# Patient Record
Sex: FEMALE | Race: BLACK OR AFRICAN AMERICAN
[De-identification: names, ages, dates, MRNs, and addresses within clinical notes are randomized per-mention and may not be internally consistent; named-entity substitution may affect disease eponyms.]

---

## 2017-02-11 ENCOUNTER — HOSPITAL ENCOUNTER (OUTPATIENT)
Age: 27
End: 2017-02-11
Payer: MEDICAID

## 2017-02-11 ENCOUNTER — HOSPITAL ENCOUNTER (EMERGENCY)
Age: 27
Discharge: HOME | End: 2017-02-11
Payer: MEDICAID

## 2017-02-11 DIAGNOSIS — S00.83XA: ICD-10-CM

## 2017-02-11 DIAGNOSIS — R03.0: ICD-10-CM

## 2017-02-11 DIAGNOSIS — H11.32: ICD-10-CM

## 2017-02-11 DIAGNOSIS — S02.2XXA: Primary | ICD-10-CM
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
## 2017-02-11 PROCEDURE — 70480 CT ORBIT/EAR/FOSSA W/O DYE: CPT

## 2017-02-11 PROCEDURE — 99283 EMERGENCY DEPT VISIT LOW MDM: CPT

## 2017-02-14 ENCOUNTER — HOSPITAL ENCOUNTER (OUTPATIENT)
Age: 27
Discharge: HOME | End: 2017-02-14
Payer: MEDICAID

## 2017-02-14 DIAGNOSIS — Z11.3: Primary | ICD-10-CM

## 2017-04-28 ENCOUNTER — HOSPITAL ENCOUNTER (OUTPATIENT)
Age: 27
Discharge: HOME | End: 2017-04-28
Payer: MEDICAID

## 2017-04-28 DIAGNOSIS — Z11.3: Primary | ICD-10-CM

## 2017-06-14 ENCOUNTER — HOSPITAL ENCOUNTER (EMERGENCY)
Dept: HOSPITAL 76 - ED | Age: 27
Discharge: HOME | End: 2017-06-14
Payer: MEDICAID

## 2017-06-14 VITALS — SYSTOLIC BLOOD PRESSURE: 114 MMHG | DIASTOLIC BLOOD PRESSURE: 72 MMHG

## 2017-06-14 DIAGNOSIS — M54.5: Primary | ICD-10-CM

## 2017-06-14 PROCEDURE — 99283 EMERGENCY DEPT VISIT LOW MDM: CPT

## 2017-06-14 NOTE — ED PHYSICIAN DOCUMENTATION
PD HPI BACK PAIN





- Stated complaint


Stated Complaint: LOW BACK PX





- Chief complaint


Chief Complaint: Back Pain





- History obtained from


History obtained from: Patient





- History of Present Illness


Timing - onset: Other (Low back pain, especially on the left for about a month 

after moving furniture at home and worse after a car trip.  She saw her primary 

care physician today and had a trigger point injection which helped briefly but 

then she was worse after that.  Pain is in the left low back and doesn't 

radiate.  No weakness, numbness, tingling of the extremities or saddle area.  

No incontinence.  No fevers.)





Review of Systems


Constitutional: denies: Fever, Chills


GI: denies: Abdominal Pain, Nausea, Vomiting


: denies: Now pregnant EGA


Musculoskeletal: denies: Neck pain





PD PAST MEDICAL HISTORY





- Past Medical History


Past Medical History: No


Cardiovascular: None


Respiratory: None


Neuro: None


Endocrine/Autoimmune: None


GI: None


GYN: None


: None


HEENT: None


Psych: None


Musculoskeletal: None


Derm: None





- Past Surgical History


Past Surgical History: No


General: Hiatal hernia repair





- Present Medications


Home Medications: 


 Ambulatory Orders











 Medication  Instructions  Recorded  Confirmed


 


Methocarbamol 750 mg PO 06/14/17 


 


Naproxen 500 mg PO BID #14 tablet 06/14/17 


 


traMADol [Ultram] 50 mg PO Q4-6H PRN #15 tablet 06/14/17 














- Allergies


Allergies/Adverse Reactions: 


 Allergies











Allergy/AdvReac Type Severity Reaction Status Date / Time


 


acetaminophen [From Vicodin] Allergy  Itching Verified 10/03/16 20:22


 


hydrocodone bitartrate * Allergy  Itching Verified 10/03/16 20:22





[From Vicodin]     














- Social History


Does the pt smoke?: No


Smoking Status: Never smoker


Does the pt drink ETOH?: Yes


Does the pt have substance abuse?: No





- Immunizations


Immunizations are current?: Yes





- POLST


Patient has POLST: No





PD ED PE NORMAL





- Vitals


Vital signs reviewed: Yes





- General


General: Alert and oriented X 3, No acute distress





- Abdomen


Abdomen: Normal bowel sounds, Soft, Non tender





- Back


Back: No CVA TTP, No spinal TTP, Other (The patient has equal and normal 

patellar and Achilles reflexes bilaterally. Normal sensation in all areas of 

the legs. Patient denies saddle anesthesia. Normal strength in flexion and 

extension at the ankles, knees and flexion of the hips.)





- Neuro


Neuro: Alert and oriented X 3, Normal speech





- Psych


Psych: Normal mood, Normal affect





Results





- Vitals


Vitals: 


 Vital Signs - 24 hr











  06/14/17





  21:04


 


Temperature 36.3 C L


 


Heart Rate 62


 


Respiratory 16





Rate 


 


Blood Pressure 114/72


 


O2 Saturation 100








 Oxygen











O2 Source                      Room air

















PD MEDICAL DECISION MAKING





- ED course


ED course: 





Spinal epidural abscess was considered in this patient. The patient has no fever

, is not diabetic, has no spinal tenderness, does not use IV drugs, and no 

bilateral neurologic symptoms. Therefore, spinal epidural abscess is considered 

exceedingly unlikely.





The online [place] prescription monitoring program was queried with regard 

to this patient. No concerning findings were found. 





Departure





- Departure


Disposition: 01 Home, Self Care


Clinical Impression: 


Lumbago


Qualifiers:


 Chronicity: acute Back pain laterality: left Sciatica presence: without 

sciatica Qualified Code(s): M54.5 - Low back pain


Condition: Good


Record reviewed to determine appropriate education?: Yes


Instructions:  ED Sprain Strain Lumbar


Prescriptions: 


Naproxen 500 mg PO BID #14 tablet


traMADol [Ultram] 50 mg PO Q4-6H PRN #15 tablet


 PRN Reason: Pain


Comments: 


Call your doctor to arrange a follow up appointment. Make the next available 

appointment. In the interim return anytime if worse or if new symptoms develop.

## 2017-08-09 ENCOUNTER — HOSPITAL ENCOUNTER (OUTPATIENT)
Dept: HOSPITAL 76 - EMS | Age: 27
End: 2017-08-09
Attending: SURGERY
Payer: MEDICAID

## 2017-08-09 DIAGNOSIS — Z03.89: Primary | ICD-10-CM

## 2017-08-30 ENCOUNTER — HOSPITAL ENCOUNTER (OUTPATIENT)
Dept: HOSPITAL 76 - LAB.R | Age: 27
Discharge: HOME | End: 2017-08-30
Attending: ADVANCED PRACTICE MIDWIFE
Payer: MEDICAID

## 2017-08-30 DIAGNOSIS — Z11.3: Primary | ICD-10-CM

## 2017-08-30 PROCEDURE — 87591 N.GONORRHOEAE DNA AMP PROB: CPT

## 2017-08-30 PROCEDURE — 87491 CHLMYD TRACH DNA AMP PROBE: CPT

## 2017-09-13 ENCOUNTER — HOSPITAL ENCOUNTER (OUTPATIENT)
Dept: HOSPITAL 76 - LAB | Age: 27
Discharge: HOME | End: 2017-09-13
Attending: ADVANCED PRACTICE MIDWIFE
Payer: MEDICAID

## 2017-09-13 DIAGNOSIS — O09.71: Primary | ICD-10-CM

## 2017-09-13 DIAGNOSIS — Z11.3: ICD-10-CM

## 2017-09-13 LAB
ALBUMIN/GLOB SERPL: 1.4 {RATIO} (ref 1–2.2)
ANION GAP SERPL CALCULATED.4IONS-SCNC: 8 MMOL/L (ref 6–13)
BASOPHILS NFR BLD AUTO: 0 10^3/UL (ref 0–0.1)
BASOPHILS NFR BLD AUTO: 0.6 %
BILIRUB BLD-MCNC: 0.6 MG/DL (ref 0.2–1)
BUN SERPL-MCNC: 10 MG/DL (ref 6–20)
CALCIUM UR-MCNC: 9.1 MG/DL (ref 8.5–10.3)
CHLORIDE SERPL-SCNC: 105 MMOL/L (ref 101–111)
CO2 SERPL-SCNC: 21 MMOL/L (ref 21–32)
CREAT SERPLBLD-SCNC: 0.7 MG/DL (ref 0.4–1)
EOSINOPHIL # BLD AUTO: 0.1 10^3/UL (ref 0–0.7)
EOSINOPHIL NFR BLD AUTO: 1.2 %
ERYTHROCYTE [DISTWIDTH] IN BLOOD BY AUTOMATED COUNT: 14.1 % (ref 12–15)
GFRSERPLBLD MDRD-ARVRAT: 121 ML/MIN/{1.73_M2} (ref 89–?)
GLOBULIN SER-MCNC: 3.2 G/DL (ref 2.1–4.2)
GLUCOSE SERPL-MCNC: 103 MG/DL (ref 70–100)
HCT VFR BLD AUTO: 41 % (ref 37–47)
HGB UR QL STRIP: 13.8 G/DL (ref 12–16)
LYMPHOCYTES # SPEC AUTO: 2.1 10^3/UL (ref 1.5–3.5)
LYMPHOCYTES NFR BLD AUTO: 36.9 %
MCH RBC QN AUTO: 30.7 PG (ref 27–31)
MCHC RBC AUTO-ENTMCNC: 33.7 G/DL (ref 32–36)
MCV RBC AUTO: 91 FL (ref 81–99)
MONOCYTES # BLD AUTO: 0.5 10^3/UL (ref 0–1)
MONOCYTES NFR BLD AUTO: 8.7 %
NEUTROPHILS # BLD AUTO: 3 10^3/UL (ref 1.5–6.6)
NEUTROPHILS # SNV AUTO: 5.7 X10^3/UL (ref 4.8–10.8)
NEUTROPHILS NFR BLD AUTO: 52.6 %
NRBC # BLD AUTO: 0 /100WBC
PDW BLD AUTO: 7.5 FL (ref 7.9–10.8)
POTASSIUM SERPL-SCNC: 3.5 MMOL/L (ref 3.5–5)
PROT SPEC-MCNC: 7.6 G/DL (ref 6.7–8.2)
RBC MAR: 4.5 10^6/UL (ref 4.2–5.4)
SODIUM SERPLBLD-SCNC: 134 MMOL/L (ref 135–145)
WBC # BLD: 5.7 X10^3/UL

## 2017-09-13 PROCEDURE — 80053 COMPREHEN METABOLIC PANEL: CPT

## 2017-09-13 PROCEDURE — 85730 THROMBOPLASTIN TIME PARTIAL: CPT

## 2017-09-13 PROCEDURE — 36415 COLL VENOUS BLD VENIPUNCTURE: CPT

## 2017-09-13 PROCEDURE — 81599 UNLISTED MAAA: CPT

## 2017-09-13 PROCEDURE — 87389 HIV-1 AG W/HIV-1&-2 AB AG IA: CPT

## 2017-09-13 PROCEDURE — 85025 COMPLETE CBC W/AUTO DIFF WBC: CPT

## 2017-09-14 LAB — TEST RESULT: (no result)

## 2017-09-15 ENCOUNTER — HOSPITAL ENCOUNTER (OUTPATIENT)
Dept: HOSPITAL 76 - LAB | Age: 27
Discharge: HOME | End: 2017-09-15
Attending: ADVANCED PRACTICE MIDWIFE
Payer: MEDICAID

## 2017-09-15 ENCOUNTER — HOSPITAL ENCOUNTER (EMERGENCY)
Dept: HOSPITAL 76 - ED | Age: 27
Discharge: HOME | End: 2017-09-15
Payer: MEDICAID

## 2017-09-15 VITALS — SYSTOLIC BLOOD PRESSURE: 106 MMHG | DIASTOLIC BLOOD PRESSURE: 67 MMHG

## 2017-09-15 DIAGNOSIS — O09.71: Primary | ICD-10-CM

## 2017-09-15 DIAGNOSIS — O09.71: ICD-10-CM

## 2017-09-15 DIAGNOSIS — M54.5: Primary | ICD-10-CM

## 2017-09-15 PROCEDURE — 84702 CHORIONIC GONADOTROPIN TEST: CPT

## 2017-09-15 PROCEDURE — 99283 EMERGENCY DEPT VISIT LOW MDM: CPT

## 2017-09-15 NOTE — ED PHYSICIAN DOCUMENTATION
PD HPI BACK PAIN





- Stated complaint


Stated Complaint: BACK PX





- Chief complaint


Chief Complaint: Back Pain





- History obtained from


History obtained from: Patient





- History of Present Illness


Timing - duration: Days (3)


Timing - details: Still present


Location: Lower


Quality: Pain, Similar to prior episodes


Associated symptoms: No: Fever, Weakness, Numbness, Incontinent of urine


Worsened by: Movement, Lifting, Twisting, Palpation





- Treatment prior to arrival


Treatment prior to arrival: 


Methocarbamol without relief.








- Additional information


Additional information: 


The patient is a 26-year-old female who presents with lower back pain that 

started 3 days ago and has persisted since that time.  It is bilateral in 

location.  She denies any recent traumatic injury.  She denies fever, urinary 

incontinence, numbness or weakness.  She has a history of similar pain 

occasionally in the past.  The last time was about 2 months ago.  Her job 

requires lifting, which is a likely contributing factor to her symptoms.








Review of Systems


Constitutional: denies: Fever


Nose: denies: Congestion


Throat: denies: Sore throat


Cardiac: denies: Chest pain / pressure


Respiratory: denies: Dyspnea, Cough


GI: denies: Abdominal Pain, Nausea, Vomiting


: denies: Dysuria, Incontinent


Skin: denies: Rash


Musculoskeletal: reports: Back pain.  denies: Extremity pain


Neurologic: denies: Focal weakness, Numbness, Headache





PD PAST MEDICAL HISTORY





- Past Medical History


Past Medical History: No


Cardiovascular: None


Respiratory: None


Neuro: None


Endocrine/Autoimmune: None


GI: None


GYN: None


: None


HEENT: None


Psych: None


Musculoskeletal: None


Derm: None





- Past Surgical History


Past Surgical History: No


General: Other (Inguinal hernia repair)





- Present Medications


Home Medications: 


 Ambulatory Orders











 Medication  Instructions  Recorded  Confirmed


 


Methocarbamol 750 mg PO DAILY PM PRN 06/14/17 09/15/17


 


Cyclobenzaprine [Flexeril] 10 mg PO TID PRN #20 tablet 09/15/17 


 


Ortho Evra 1 patch TD Q7D 09/15/17 09/15/17


 


traMADol [Ultram] 50 mg PO Q4-6H PRN #20 tablet 09/15/17 














- Allergies


Allergies/Adverse Reactions: 


 Allergies











Allergy/AdvReac Type Severity Reaction Status Date / Time


 


No Known Drug Allergies Allergy   Verified 09/15/17 15:56














- Social History


Does the pt smoke?: No


Smoking Status: Never smoker


Does the pt drink ETOH?: Yes


Does the pt have substance abuse?: No





- Immunizations


Immunizations are current?: Yes





- POLST


Patient has POLST: No





PD ED PE NORMAL





- Vitals


Vital signs reviewed: Yes (normal)





- General


General: Alert and oriented X 3, Well developed/nourished





- HEENT


HEENT: Atraumatic, EOMI, Pharynx benign





- Neck


Neck: No bony TTP, No JVD





- Cardiac


Cardiac: RRR, No murmur





- Respiratory


Respiratory: No respiratory distress, Clear bilaterally





- Abdomen


Abdomen: Soft, Non tender





- Back


Back: No CVA TTP, Other (There is tenderness to palpation across the lower 

lumbar region, without focal tenderness to palpation over the spinous processes.

)





- Derm


Derm: No rash





- Extremities


Extremities: No edema, No calf tenderness / cord, Other (Straight-leg raise is 

negative bilaterally.)





- Neuro


Neuro: Alert and oriented X 3, No motor deficit, No sensory deficit, Other (

Deep tendon reflexes are 2+ and equal bilaterally at the patellar and Achilles 

tendons.)





Results





- Vitals


Vitals: 


 Oxygen











O2 Source                      Room air

















PD MEDICAL DECISION MAKING





- ED course


Complexity details: reviewed old records, considered differential, d/w patient


ED course: 





The patient's presentation is most consistent with acute lumbar strain.  Her 

presentation does not suggest epidural abscess, cauda equina syndrome, spinal 

stenosis.  Treatment in the emergency department included administration of 

ibuprofen 800 mg orally.  She is being discharged with prescriptions for 

Flexeril and for tramadol, 20 tablets.  I discussed with her the likely course 

of illness, symptomatic treatment and outpatient follow-up, as well as 

potentially worrisome signs or symptoms that should prompt reevaluation in the 

emergency department.





Departure





- Departure


Disposition: 01 Home, Self Care


Clinical Impression: 


Back pain


Qualifiers:


 Back pain location: low back pain Chronicity: acute Back pain laterality: 

bilateral Sciatica presence: without sciatica Qualified Code(s): M54.5 - Low 

back pain


Condition: Stable


Instructions:  ED Low Back Pain Injury


Follow-Up: 


[facility] [Provider Group]


Prescriptions: 


Cyclobenzaprine [Flexeril] 10 mg PO TID PRN #20 tablet


 PRN Reason: Spasms


traMADol [Ultram] 50 mg PO Q4-6H PRN #20 tablet


 PRN Reason: Pain


Comments: 


1.  Apply ice pack to your lower back intermittently for the next 3 or 4 days.


2.  You can use ibuprofen, up to 600 mg 3 times daily for its anti-inflammatory 

effect.


3.  You can use Ultram as prescribed as needed for pain.


4.  You can use Flexeril as prescribed if needed for muscle spasms.


5.  Follow-up with your primary physician within 2 weeks.  Call to schedule an 

appointment.


6.  Return to the emergency department if you develop increasing back pain, or 

otherwise worsening symptoms.


Discharge Date/Time: 09/15/17 18:01

## 2017-09-28 ENCOUNTER — HOSPITAL ENCOUNTER (OUTPATIENT)
Dept: HOSPITAL 76 - LAB.N | Age: 27
Discharge: HOME | End: 2017-09-28
Attending: ADVANCED PRACTICE MIDWIFE
Payer: MEDICAID

## 2017-09-28 DIAGNOSIS — O09.71: Primary | ICD-10-CM

## 2017-09-28 PROCEDURE — 84702 CHORIONIC GONADOTROPIN TEST: CPT

## 2017-09-28 PROCEDURE — 36415 COLL VENOUS BLD VENIPUNCTURE: CPT

## 2017-10-12 ENCOUNTER — HOSPITAL ENCOUNTER (OUTPATIENT)
Dept: HOSPITAL 76 - LAB.N | Age: 27
Discharge: HOME | End: 2017-10-12
Attending: ADVANCED PRACTICE MIDWIFE
Payer: MEDICAID

## 2017-10-12 DIAGNOSIS — Z32.01: Primary | ICD-10-CM

## 2017-10-12 PROCEDURE — 36415 COLL VENOUS BLD VENIPUNCTURE: CPT

## 2017-10-12 PROCEDURE — 84702 CHORIONIC GONADOTROPIN TEST: CPT

## 2018-01-15 ENCOUNTER — HOSPITAL ENCOUNTER (OUTPATIENT)
Dept: HOSPITAL 76 - LAB.R | Age: 28
Discharge: HOME | End: 2018-01-15
Attending: ADVANCED PRACTICE MIDWIFE
Payer: MEDICAID

## 2018-01-15 DIAGNOSIS — N76.1: Primary | ICD-10-CM

## 2018-01-15 PROCEDURE — 87491 CHLMYD TRACH DNA AMP PROBE: CPT

## 2018-01-15 PROCEDURE — 87591 N.GONORRHOEAE DNA AMP PROB: CPT

## 2018-03-31 ENCOUNTER — HOSPITAL ENCOUNTER (EMERGENCY)
Dept: HOSPITAL 76 - ED | Age: 28
Discharge: HOME | End: 2018-03-31
Payer: MEDICAID

## 2018-03-31 VITALS — DIASTOLIC BLOOD PRESSURE: 93 MMHG | SYSTOLIC BLOOD PRESSURE: 131 MMHG

## 2018-03-31 DIAGNOSIS — F17.200: ICD-10-CM

## 2018-03-31 DIAGNOSIS — H10.31: Primary | ICD-10-CM

## 2018-03-31 PROCEDURE — 99283 EMERGENCY DEPT VISIT LOW MDM: CPT

## 2018-03-31 PROCEDURE — 99282 EMERGENCY DEPT VISIT SF MDM: CPT

## 2018-03-31 NOTE — ED PHYSICIAN DOCUMENTATION
PD HPI OPHTHO





- Stated complaint


Stated Complaint: R EYE MUCUS





- Chief complaint


Chief Complaint: Heent





- History obtained from


History obtained from: Patient





- History of Present Illness


Timing - onset: Other (1 day of right eye redness without visual deficits, she 

has a gritty feeling in there and some URI symptoms but no fevers.  She does 

not wear contacts.)





Review of Systems


Constitutional: denies: Fever, Chills


Eyes: reports: Discharge, Irritation.  denies: Loss of vision, Decreased vision

, Photophobia


Ears: denies: Loss of hearing, Ear pain





PD PAST MEDICAL HISTORY





- Past Medical History


Past Medical History: No


Cardiovascular: None


Respiratory: None


Neuro: None


Endocrine/Autoimmune: None


GI: None


GYN: None


: None


HEENT: None


Psych: None


Musculoskeletal: None


Derm: None





- Past Surgical History


Past Surgical History: No


General: Other





- Allergies


Allergies/Adverse Reactions: 


 Allergies











Allergy/AdvReac Type Severity Reaction Status Date / Time


 


No Known Drug Allergies Allergy   Verified 09/15/17 15:56














- Social History


Does the pt smoke?: Yes


Smoking Status: Current every day smoker


Does the pt drink ETOH?: Yes


Does the pt have substance abuse?: No


Substance Use and Type: Marijuana





- Immunizations


Immunizations are current?: Yes





- POLST


Patient has POLST: No





PD ED PE NORMAL





- Vitals


Vital signs reviewed: Yes





- General


General: Alert and oriented X 3, No acute distress





- HEENT


HEENT: PERRL, EOMI, Other (Right-sided conjunctivitis with a small amount of 

purulent discharge, no fluorescein uptake.)





- Neck


Neck: Supple, no meningeal sign, No bony TTP





- Neuro


Neuro: Alert and oriented X 3, Normal speech





Results





- Vitals


Vitals: 





 Vital Signs - 24 hr











  03/31/18





  13:23


 


Temperature 36.4 C L


 


Heart Rate 94


 


Respiratory 14





Rate 


 


Blood Pressure 127/88 H


 


O2 Saturation 100








 Oxygen











O2 Source                      Room air

















Departure





- Departure


Disposition: 01 Home, Self Care


Clinical Impression: 


Conjunctivitis


Qualifiers:


 Conjunctivitis type: acute Acute conjunctivitis type: unspecified Laterality: 

right Qualified Code(s): H10.31 - Unspecified acute conjunctivitis, right eye





Condition: Good


Record reviewed to determine appropriate education?: Yes


Instructions:  ED Conjunctivitis Nonspecific


Comments: 


Use the antibiotic ointment five times a day.


Follow-up with your doctor in 2-3 days if not improved, return if worse.

## 2018-04-27 ENCOUNTER — HOSPITAL ENCOUNTER (OUTPATIENT)
Dept: HOSPITAL 76 - LAB.R | Age: 28
Discharge: HOME | End: 2018-04-27
Attending: ADVANCED PRACTICE MIDWIFE
Payer: MEDICAID

## 2018-04-27 DIAGNOSIS — Z11.3: Primary | ICD-10-CM

## 2018-04-27 PROCEDURE — 87491 CHLMYD TRACH DNA AMP PROBE: CPT

## 2018-04-27 PROCEDURE — 87591 N.GONORRHOEAE DNA AMP PROB: CPT

## 2018-08-01 ENCOUNTER — HOSPITAL ENCOUNTER (OUTPATIENT)
Dept: HOSPITAL 76 - LAB.R | Age: 28
End: 2018-08-01
Attending: ADVANCED PRACTICE MIDWIFE
Payer: MEDICAID

## 2018-08-01 DIAGNOSIS — Z11.3: Primary | ICD-10-CM

## 2018-08-01 PROCEDURE — 87591 N.GONORRHOEAE DNA AMP PROB: CPT

## 2018-08-01 PROCEDURE — 87491 CHLMYD TRACH DNA AMP PROBE: CPT

## 2018-12-04 ENCOUNTER — HOSPITAL ENCOUNTER (OUTPATIENT)
Dept: HOSPITAL 76 - LAB.R | Age: 28
Discharge: HOME | End: 2018-12-04
Attending: ADVANCED PRACTICE MIDWIFE
Payer: MEDICAID

## 2018-12-04 DIAGNOSIS — N89.8: Primary | ICD-10-CM

## 2018-12-04 PROCEDURE — 87491 CHLMYD TRACH DNA AMP PROBE: CPT

## 2018-12-04 PROCEDURE — 87591 N.GONORRHOEAE DNA AMP PROB: CPT

## 2019-01-24 ENCOUNTER — HOSPITAL ENCOUNTER (OUTPATIENT)
Dept: HOSPITAL 76 - LAB.N | Age: 29
Discharge: HOME | End: 2019-01-24
Attending: ADVANCED PRACTICE MIDWIFE
Payer: MEDICAID

## 2019-01-24 DIAGNOSIS — Z11.3: ICD-10-CM

## 2019-01-24 DIAGNOSIS — Z00.00: Primary | ICD-10-CM

## 2019-01-24 LAB
BASOPHILS NFR BLD AUTO: 0 10^3/UL (ref 0–0.1)
BASOPHILS NFR BLD AUTO: 0.5 %
CHOLEST SERPL-MCNC: 175 MG/DL
EOSINOPHIL # BLD AUTO: 0.1 10^3/UL (ref 0–0.7)
EOSINOPHIL NFR BLD AUTO: 1.3 %
ERYTHROCYTE [DISTWIDTH] IN BLOOD BY AUTOMATED COUNT: 13.6 % (ref 12–15)
EST. AVERAGE GLUCOSE BLD GHB EST-MCNC: 97 MG/DL (ref 70–100)
HB2 TOTAL: 14.6 G/DL
HBA1C BLD-MCNC: 0.45 G/DL
HDLC SERPL-MCNC: 78 MG/DL
HDLC SERPL: 2.2 {RATIO} (ref ?–4.4)
HEMOGLOBIN A1C %: 5 % (ref 4.6–6.2)
HGB UR QL STRIP: 13.9 G/DL (ref 12–16)
LDLC SERPL CALC-MCNC: 89 MG/DL
LDLC/HDLC SERPL: 1.1 {RATIO} (ref ?–4.4)
LYMPHOCYTES # SPEC AUTO: 1.6 10^3/UL (ref 1.5–3.5)
LYMPHOCYTES NFR BLD AUTO: 39 %
MCH RBC QN AUTO: 31.4 PG (ref 27–31)
MCHC RBC AUTO-ENTMCNC: 34 G/DL (ref 32–36)
MCV RBC AUTO: 92.3 FL (ref 81–99)
MONOCYTES # BLD AUTO: 0.3 10^3/UL (ref 0–1)
MONOCYTES NFR BLD AUTO: 7 %
NEUTROPHILS # BLD AUTO: 2.2 10^3/UL (ref 1.5–6.6)
NEUTROPHILS # SNV AUTO: 4.1 X10^3/UL (ref 4.8–10.8)
NEUTROPHILS NFR BLD AUTO: 52.2 %
PDW BLD AUTO: 7.9 FL (ref 7.9–10.8)
PLATELET # BLD: 222 10^3/UL (ref 130–450)
RBC MAR: 4.42 10^6/UL (ref 4.2–5.4)
VLDLC SERPL-SCNC: 8 MG/DL

## 2019-01-24 PROCEDURE — 83036 HEMOGLOBIN GLYCOSYLATED A1C: CPT

## 2019-01-24 PROCEDURE — 86803 HEPATITIS C AB TEST: CPT

## 2019-01-24 PROCEDURE — 80061 LIPID PANEL: CPT

## 2019-01-24 PROCEDURE — 82947 ASSAY GLUCOSE BLOOD QUANT: CPT

## 2019-01-24 PROCEDURE — 87389 HIV-1 AG W/HIV-1&-2 AB AG IA: CPT

## 2019-01-24 PROCEDURE — 36415 COLL VENOUS BLD VENIPUNCTURE: CPT

## 2019-01-24 PROCEDURE — 83721 ASSAY OF BLOOD LIPOPROTEIN: CPT

## 2019-01-24 PROCEDURE — 84443 ASSAY THYROID STIM HORMONE: CPT

## 2019-01-24 PROCEDURE — 85025 COMPLETE CBC W/AUTO DIFF WBC: CPT

## 2019-01-25 LAB
HEPATITIS C ANTIBODY: (no result)
HIV AG/AB 4TH GEN: (no result)
SIGNAL TO CUT-OFF: 0 (ref ?–1)

## 2019-06-03 ENCOUNTER — HOSPITAL ENCOUNTER (EMERGENCY)
Dept: HOSPITAL 76 - ED | Age: 29
Discharge: HOME | End: 2019-06-03
Payer: MEDICAID

## 2019-06-03 ENCOUNTER — HOSPITAL ENCOUNTER (OUTPATIENT)
Dept: HOSPITAL 76 - EMS | Age: 29
Discharge: ANOTHER HOSPITAL | End: 2019-06-03
Attending: SURGERY
Payer: MEDICAID

## 2019-06-03 VITALS — DIASTOLIC BLOOD PRESSURE: 90 MMHG | SYSTOLIC BLOOD PRESSURE: 117 MMHG

## 2019-06-03 DIAGNOSIS — R41.82: ICD-10-CM

## 2019-06-03 DIAGNOSIS — T50.901A: Primary | ICD-10-CM

## 2019-06-03 DIAGNOSIS — R44.3: ICD-10-CM

## 2019-06-03 DIAGNOSIS — R45.1: ICD-10-CM

## 2019-06-03 DIAGNOSIS — R44.3: Primary | ICD-10-CM

## 2019-06-03 DIAGNOSIS — E87.6: ICD-10-CM

## 2019-06-03 DIAGNOSIS — F17.200: ICD-10-CM

## 2019-06-03 DIAGNOSIS — R00.0: ICD-10-CM

## 2019-06-03 DIAGNOSIS — R44.8: ICD-10-CM

## 2019-06-03 LAB
ALBUMIN DIAFP-MCNC: 4.6 G/DL (ref 3.2–5.5)
ALBUMIN/GLOB SERPL: 1.3 {RATIO} (ref 1–2.2)
ALP SERPL-CCNC: 61 IU/L (ref 42–121)
ALT SERPL W P-5'-P-CCNC: 27 IU/L (ref 10–60)
AMPHET UR QL SCN: POSITIVE
ANION GAP SERPL CALCULATED.4IONS-SCNC: 16 MMOL/L (ref 6–13)
AST SERPL W P-5'-P-CCNC: 33 IU/L (ref 10–42)
BASOPHILS NFR BLD AUTO: 0 10^3/UL (ref 0–0.1)
BASOPHILS NFR BLD AUTO: 0.7 %
BENZODIAZ UR QL SCN: POSITIVE
BILIRUB BLD-MCNC: 1.2 MG/DL (ref 0.2–1)
BUN SERPL-MCNC: < 5 MG/DL (ref 6–20)
CALCIUM UR-MCNC: 9.7 MG/DL (ref 8.5–10.3)
CHLORIDE SERPL-SCNC: 104 MMOL/L (ref 101–111)
CLARITY UR REFRACT.AUTO: CLEAR
CO2 SERPL-SCNC: 19 MMOL/L (ref 21–32)
COCAINE UR-SCNC: POSITIVE UMOL/L
CREAT SERPLBLD-SCNC: 0.9 MG/DL (ref 0.4–1)
EOSINOPHIL # BLD AUTO: 0 10^3/UL (ref 0–0.7)
EOSINOPHIL NFR BLD AUTO: 0.5 %
ERYTHROCYTE [DISTWIDTH] IN BLOOD BY AUTOMATED COUNT: 14.3 % (ref 12–15)
GFRSERPLBLD MDRD-ARVRAT: 89 ML/MIN/{1.73_M2} (ref 89–?)
GLOBULIN SER-MCNC: 3.5 G/DL (ref 2.1–4.2)
GLUCOSE SERPL-MCNC: 111 MG/DL (ref 70–100)
GLUCOSE UR QL STRIP.AUTO: NEGATIVE MG/DL
HCG UR QL: NEGATIVE
HGB UR QL STRIP: 13.5 G/DL (ref 12–16)
KETONES UR QL STRIP.AUTO: 15 MG/DL
LIPASE SERPL-CCNC: 21 U/L (ref 22–51)
LYMPHOCYTES # SPEC AUTO: 1.3 10^3/UL (ref 1.5–3.5)
LYMPHOCYTES NFR BLD AUTO: 22.8 %
MCH RBC QN AUTO: 30.9 PG (ref 27–31)
MCHC RBC AUTO-ENTMCNC: 34 G/DL (ref 32–36)
MCV RBC AUTO: 90.9 FL (ref 81–99)
METHADONE UR QL SCN: NEGATIVE
METHAMPHET UR QL SCN: POSITIVE
MONOCYTES # BLD AUTO: 0.6 10^3/UL (ref 0–1)
MONOCYTES NFR BLD AUTO: 9.8 %
NEUTROPHILS # BLD AUTO: 3.9 10^3/UL (ref 1.5–6.6)
NEUTROPHILS # SNV AUTO: 5.9 X10^3/UL (ref 4.8–10.8)
NEUTROPHILS NFR BLD AUTO: 66.2 %
NITRITE UR QL STRIP.AUTO: NEGATIVE
OPIATES UR QL SCN: NEGATIVE
PDW BLD AUTO: 7.4 FL (ref 7.9–10.8)
PH UR STRIP.AUTO: 7.5 PH (ref 5–7.5)
PLATELET # BLD: 273 10^3/UL (ref 130–450)
PROT SPEC-MCNC: 8.1 G/DL (ref 6.7–8.2)
PROT UR STRIP.AUTO-MCNC: NEGATIVE MG/DL
RBC # UR STRIP.AUTO: (no result) /UL
RBC MAR: 4.37 10^6/UL (ref 4.2–5.4)
SODIUM SERPLBLD-SCNC: 139 MMOL/L (ref 135–145)
SP GR UR STRIP.AUTO: 1.01 (ref 1–1.03)
UROBILINOGEN UR QL STRIP.AUTO: (no result) E.U./DL
UROBILINOGEN UR STRIP.AUTO-MCNC: NEGATIVE MG/DL
VOLATILE DRUGS POS SERPL SCN: (no result)

## 2019-06-03 PROCEDURE — 85025 COMPLETE CBC W/AUTO DIFF WBC: CPT

## 2019-06-03 PROCEDURE — 99285 EMERGENCY DEPT VISIT HI MDM: CPT

## 2019-06-03 PROCEDURE — 83690 ASSAY OF LIPASE: CPT

## 2019-06-03 PROCEDURE — 80306 DRUG TEST PRSMV INSTRMNT: CPT

## 2019-06-03 PROCEDURE — 84484 ASSAY OF TROPONIN QUANT: CPT

## 2019-06-03 PROCEDURE — 81025 URINE PREGNANCY TEST: CPT

## 2019-06-03 PROCEDURE — 96366 THER/PROPH/DIAG IV INF ADDON: CPT

## 2019-06-03 PROCEDURE — 96365 THER/PROPH/DIAG IV INF INIT: CPT

## 2019-06-03 PROCEDURE — 81001 URINALYSIS AUTO W/SCOPE: CPT

## 2019-06-03 PROCEDURE — 36415 COLL VENOUS BLD VENIPUNCTURE: CPT

## 2019-06-03 PROCEDURE — 80053 COMPREHEN METABOLIC PANEL: CPT

## 2019-06-03 PROCEDURE — 81003 URINALYSIS AUTO W/O SCOPE: CPT

## 2019-06-03 PROCEDURE — 96361 HYDRATE IV INFUSION ADD-ON: CPT

## 2019-06-03 PROCEDURE — 96372 THER/PROPH/DIAG INJ SC/IM: CPT

## 2019-06-03 PROCEDURE — 99284 EMERGENCY DEPT VISIT MOD MDM: CPT

## 2019-06-03 PROCEDURE — 87086 URINE CULTURE/COLONY COUNT: CPT

## 2019-06-03 PROCEDURE — 96375 TX/PRO/DX INJ NEW DRUG ADDON: CPT

## 2019-06-03 PROCEDURE — 51701 INSERT BLADDER CATHETER: CPT

## 2019-06-03 PROCEDURE — 80320 DRUG SCREEN QUANTALCOHOLS: CPT

## 2019-09-05 ENCOUNTER — HOSPITAL ENCOUNTER (OUTPATIENT)
Dept: HOSPITAL 76 - LAB.R | Age: 29
Discharge: HOME | End: 2019-09-05
Attending: OBSTETRICS & GYNECOLOGY
Payer: COMMERCIAL

## 2019-09-05 DIAGNOSIS — Z71.89: Primary | ICD-10-CM

## 2019-09-05 LAB — T VAGINALIS RRNA GENITAL QL PROBE: NEGATIVE

## 2019-09-05 PROCEDURE — 87591 N.GONORRHOEAE DNA AMP PROB: CPT

## 2019-09-05 PROCEDURE — 87801 DETECT AGNT MULT DNA AMPLI: CPT

## 2019-09-05 PROCEDURE — 87661 TRICHOMONAS VAGINALIS AMPLIF: CPT

## 2019-09-05 PROCEDURE — 87491 CHLMYD TRACH DNA AMP PROBE: CPT

## 2019-09-06 LAB
C KRUSEI DNA VAG QL NAA+PROBE: (no result)
CANDIDA DNA VAG QL NAA+PROBE: (no result)
T VAGINALIS RRNA GENITAL QL PROBE: (no result)

## 2020-01-02 ENCOUNTER — HOSPITAL ENCOUNTER (OUTPATIENT)
Dept: HOSPITAL 76 - LAB.R | Age: 30
Discharge: HOME | End: 2020-01-02
Attending: OBSTETRICS & GYNECOLOGY
Payer: MEDICAID

## 2020-01-02 DIAGNOSIS — Z71.89: Primary | ICD-10-CM

## 2020-01-02 DIAGNOSIS — N89.8: ICD-10-CM

## 2020-01-02 DIAGNOSIS — Z30.9: ICD-10-CM

## 2020-01-02 LAB
C KRUSEI DNA VAG QL NAA+PROBE: NEGATIVE
CANDIDA DNA VAG QL NAA+PROBE: NEGATIVE
T VAGINALIS RRNA GENITAL QL PROBE: (no result)
T VAGINALIS RRNA GENITAL QL PROBE: NEGATIVE

## 2020-01-02 PROCEDURE — 87801 DETECT AGNT MULT DNA AMPLI: CPT

## 2020-01-02 PROCEDURE — 87591 N.GONORRHOEAE DNA AMP PROB: CPT

## 2020-01-02 PROCEDURE — 87661 TRICHOMONAS VAGINALIS AMPLIF: CPT

## 2020-01-02 PROCEDURE — 87491 CHLMYD TRACH DNA AMP PROBE: CPT

## 2020-02-05 ENCOUNTER — HOSPITAL ENCOUNTER (OUTPATIENT)
Dept: HOSPITAL 76 - LAB.R | Age: 30
Discharge: HOME | End: 2020-02-05
Attending: OBSTETRICS & GYNECOLOGY
Payer: MEDICAID

## 2020-02-05 DIAGNOSIS — Z11.3: Primary | ICD-10-CM

## 2020-02-05 LAB — T VAGINALIS RRNA GENITAL QL PROBE: NEGATIVE

## 2020-02-05 PROCEDURE — 87661 TRICHOMONAS VAGINALIS AMPLIF: CPT

## 2020-02-05 PROCEDURE — 87591 N.GONORRHOEAE DNA AMP PROB: CPT

## 2020-02-05 PROCEDURE — 87491 CHLMYD TRACH DNA AMP PROBE: CPT

## 2020-02-13 ENCOUNTER — HOSPITAL ENCOUNTER (OUTPATIENT)
Dept: HOSPITAL 76 - EMS | Age: 30
End: 2020-02-13
Attending: SURGERY
Payer: MEDICAID

## 2020-02-13 DIAGNOSIS — F41.9: Primary | ICD-10-CM

## 2020-06-24 ENCOUNTER — HOSPITAL ENCOUNTER (OUTPATIENT)
Dept: HOSPITAL 76 - LAB.R | Age: 30
Discharge: HOME | End: 2020-06-24
Attending: PHYSICIAN ASSISTANT
Payer: MEDICAID

## 2020-06-24 ENCOUNTER — HOSPITAL ENCOUNTER (OUTPATIENT)
Dept: HOSPITAL 76 - LAB.WCP | Age: 30
Discharge: HOME | End: 2020-06-24
Attending: PHYSICIAN ASSISTANT
Payer: MEDICAID

## 2020-06-24 DIAGNOSIS — Z11.3: ICD-10-CM

## 2020-06-24 DIAGNOSIS — Z00.00: Primary | ICD-10-CM

## 2020-06-24 DIAGNOSIS — Z11.3: Primary | ICD-10-CM

## 2020-06-24 DIAGNOSIS — N89.8: ICD-10-CM

## 2020-06-24 LAB
ALBUMIN DIAFP-MCNC: 4.4 G/DL (ref 3.2–5.5)
ALBUMIN/GLOB SERPL: 1.5 {RATIO} (ref 1–2.2)
ALP SERPL-CCNC: 51 IU/L (ref 42–121)
ALT SERPL W P-5'-P-CCNC: 27 IU/L (ref 10–60)
ANION GAP SERPL CALCULATED.4IONS-SCNC: 7 MMOL/L (ref 6–13)
AST SERPL W P-5'-P-CCNC: 26 IU/L (ref 10–42)
BASOPHILS NFR BLD AUTO: 0 10^3/UL (ref 0–0.1)
BASOPHILS NFR BLD AUTO: 0.7 %
BILIRUB BLD-MCNC: 0.5 MG/DL (ref 0.2–1)
BUN SERPL-MCNC: 11 MG/DL (ref 6–20)
C KRUSEI DNA VAG QL NAA+PROBE: NEGATIVE
CALCIUM UR-MCNC: 8.8 MG/DL (ref 8.5–10.3)
CANDIDA DNA VAG QL NAA+PROBE: NEGATIVE
CHLORIDE SERPL-SCNC: 109 MMOL/L (ref 101–111)
CO2 SERPL-SCNC: 23 MMOL/L (ref 21–32)
CREAT SERPLBLD-SCNC: 0.7 MG/DL (ref 0.4–1)
EOSINOPHIL # BLD AUTO: 0.1 10^3/UL (ref 0–0.7)
EOSINOPHIL NFR BLD AUTO: 1.3 %
ERYTHROCYTE [DISTWIDTH] IN BLOOD BY AUTOMATED COUNT: 13.8 % (ref 12–15)
EST. AVERAGE GLUCOSE BLD GHB EST-MCNC: 103 MG/DL (ref 70–100)
GLOBULIN SER-MCNC: 3 G/DL (ref 2.1–4.2)
GLUCOSE SERPL-MCNC: 87 MG/DL (ref 70–100)
HB2 TOTAL: 14.3 G/DL
HBA1C BLD-MCNC: 0.47 G/DL
HEMOGLOBIN A1C %: 5.2 % (ref 4.6–6.2)
HGB UR QL STRIP: 13.6 G/DL (ref 12–16)
LYMPHOCYTES # SPEC AUTO: 1.7 10^3/UL (ref 1.5–3.5)
LYMPHOCYTES NFR BLD AUTO: 38.2 %
MCH RBC QN AUTO: 30 PG (ref 27–31)
MCHC RBC AUTO-ENTMCNC: 31.9 G/DL (ref 32–36)
MCV RBC AUTO: 94.3 FL (ref 81–99)
MONOCYTES # BLD AUTO: 0.6 10^3/UL (ref 0–1)
MONOCYTES NFR BLD AUTO: 12.1 %
NEUTROPHILS # BLD AUTO: 2.2 10^3/UL (ref 1.5–6.6)
NEUTROPHILS # SNV AUTO: 4.5 X10^3/UL (ref 4.8–10.8)
NEUTROPHILS NFR BLD AUTO: 47.5 %
PDW BLD AUTO: 9.6 FL (ref 7.9–10.8)
PLATELET # BLD: 267 10^3/UL (ref 130–450)
PROT SPEC-MCNC: 7.4 G/DL (ref 6.7–8.2)
RBC MAR: 4.53 10^6/UL (ref 4.2–5.4)
SODIUM SERPLBLD-SCNC: 139 MMOL/L (ref 135–145)
T VAGINALIS RRNA GENITAL QL PROBE: NEGATIVE
T VAGINALIS RRNA GENITAL QL PROBE: NEGATIVE

## 2020-06-24 PROCEDURE — 36415 COLL VENOUS BLD VENIPUNCTURE: CPT

## 2020-06-24 PROCEDURE — 84443 ASSAY THYROID STIM HORMONE: CPT

## 2020-06-24 PROCEDURE — 86803 HEPATITIS C AB TEST: CPT

## 2020-06-24 PROCEDURE — 87591 N.GONORRHOEAE DNA AMP PROB: CPT

## 2020-06-24 PROCEDURE — 81599 UNLISTED MAAA: CPT

## 2020-06-24 PROCEDURE — 87491 CHLMYD TRACH DNA AMP PROBE: CPT

## 2020-06-24 PROCEDURE — 85025 COMPLETE CBC W/AUTO DIFF WBC: CPT

## 2020-06-24 PROCEDURE — 87801 DETECT AGNT MULT DNA AMPLI: CPT

## 2020-06-24 PROCEDURE — 80053 COMPREHEN METABOLIC PANEL: CPT

## 2020-06-24 PROCEDURE — 83036 HEMOGLOBIN GLYCOSYLATED A1C: CPT

## 2020-06-24 PROCEDURE — 86592 SYPHILIS TEST NON-TREP QUAL: CPT

## 2020-06-24 PROCEDURE — 86696 HERPES SIMPLEX TYPE 2 TEST: CPT

## 2020-06-24 PROCEDURE — 86695 HERPES SIMPLEX TYPE 1 TEST: CPT

## 2020-06-24 PROCEDURE — 87661 TRICHOMONAS VAGINALIS AMPLIF: CPT

## 2020-06-24 PROCEDURE — 87389 HIV-1 AG W/HIV-1&-2 AB AG IA: CPT

## 2020-06-25 LAB
HEPATITIS C ANTIBODY: (no result)
HIV AG/AB 4TH GEN: (no result)
SIGNAL TO CUT-OFF: 0 (ref ?–1)

## 2020-06-26 LAB
HSV 1 IGG TYPE SPECIFIC AB: 41.8 INDEX
HSV 2 IGG TYPE SPECIFIC AB: 11.3 INDEX